# Patient Record
(demographics unavailable — no encounter records)

---

## 2025-01-30 NOTE — PHYSICAL EXAM
[de-identified] : Left wrist -Pain with circumduction of the wrist, pain with flexion extension of the wrist.  Mild pain with radial ulnar deviation of the wrist.  Pain is most on the volar ulnar aspect of her wrist No tenderness over the TFCC/ulnar fovea Maximal point tenderness palpation is over the FCU tendon over the volar ulnar wrist No numbness or tingling No subluxation or tenderness over the ECU tendon Negative LT and DRUJ ballottment test.  No DRUJ instability [de-identified] : Three-view x-ray left wrist including AP lateral oblique taken in urgent care 1-23 were personally reviewed today's demonstrate no fracture dislocations or significant degenerative change

## 2025-01-30 NOTE — HISTORY OF PRESENT ILLNESS
[FreeTextEntry1] : 59-year-old female presenting for evaluation left wrist pain.  The pain is on the ulnar aspect wrist, it has been present approximately 1 week.  She she was seen in urgent care x-rays taken demonstrate no fractures.  She has a prior history of bilateral thumb pain no prior injury.  She states that was aggravated after she was shoveling snow and felt a firm stop.  Pain is now worse with twisting type motions as well as flexion extension of the wrist.  She is retired.  She is otherwise healthy.  She has been taking ibuprofen 800 mg which did provide relief.  It does feel slightly better than when she was first evaluated.

## 2025-01-30 NOTE — ASSESSMENT
[FreeTextEntry1] : 59-year-old female left wrist FCU tendinitis -Patient is concerned about a tear of her FCU tendon, she does have mild tenderness in her TFCC as well and pain with circumduction.  Will obtain an MRI to further evaluate ulnar wrist pain.  MRI ordered today she is going to follow-up after the MRI is obtained -I did recommend wearing a wrist brace, she is okay to come out of this at rest but she should wear the majority of the time until her pain begins to subside - We discussed meloxicam and the patient will trial 2-week course. Patient advised not to take any NSAIDs at this time.  We discussed potential GI effects as well as kidney effects. Pain denies history of GI or kidney issues and will discontinue the medication immediately if she does notice stomach problems.  Advised to take with food.

## 2025-03-18 NOTE — ASSESSMENT
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  She has been taking intermittent Aleve she is trying several different over-the-counter medications as well for joint pain  -I recommended she continue therapy until she hits a place with the plateaus overall she is greatly improved since her initial visit we discussed that this pain may wax and wane over time and may be exacerbated by activity , brace as needed but she should wean out of it for now.   2. bilateral thumb cmc Patient has longstanding bilateral thumb CMC arthritis, she has minimal treatment thus far -We discussed the etiology of the patients symptoms. We discussed the natural history of CMC arthritis and different forms of treatment.  We discussed nonoperative management with bracing, NSAIDs.  We discussed steroid injection into the joint.  She is going to continue therapy NSAIDs and bracing as needed.  She will return if her pain recurs or persists and we can trial corticosteroid junction at that time

## 2025-03-18 NOTE — PHYSICAL EXAM
[de-identified] : Left wrist -Pain and swelling of the wrist has improved, tenderness over the FCU is improved still mildly present. Resolved tenderness over the TFCC/ulnar fovea and ECU No numbness or tingling No subluxation or tenderness over the ECU tendon Negative LT and DRUJ ballottment test.  No DRUJ instability, DRUJ stability comparable to the contralateral side She is able to make a full composite fist  Bilateral hand and wrist-CMC exam - tenderness over thumb CMC, positive CMC grind no hyperextension at MCP  no tenderness over thumb A1 pulley - neg Finkelstein's test, pain over the first dorsal compartment.